# Patient Record
Sex: FEMALE | NOT HISPANIC OR LATINO | URBAN - METROPOLITAN AREA
[De-identification: names, ages, dates, MRNs, and addresses within clinical notes are randomized per-mention and may not be internally consistent; named-entity substitution may affect disease eponyms.]

---

## 2020-04-01 ENCOUNTER — HOSPITAL ENCOUNTER (OUTPATIENT)
Dept: GASTROENTEROLOGY | Facility: HOSPITAL | Age: 65
Setting detail: HOSPITAL OUTPATIENT SURGERY
Discharge: HOME | End: 2020-04-01
Attending: INTERNAL MEDICINE

## 2020-04-01 ENCOUNTER — OFFICE VISIT CONVERTED (OUTPATIENT)
Dept: PULMONOLOGY | Facility: CLINIC | Age: 65
End: 2020-04-01
Attending: INTERNAL MEDICINE

## 2020-04-01 LAB
EPI CELLS NFR FLD: 4 %
GLUCOSE BLD-MCNC: 227 MG/DL (ref 65–99)
GLUCOSE BLD-MCNC: 302 MG/DL (ref 65–99)
LYMPHOCYTES NFR FLD MANUAL: 4 %
MACROPHAGE FLUID: 15 /100{WBCS}
NEUTROPHILS NFR FLD MANUAL: 77 %
VISUAL PRESENCE OF BLOOD: NEGATIVE

## 2020-04-03 LAB
CEFEPIME SUSC ISLT: 2
CEFTAZIDIME SUSC ISLT: 4
CIPROFLOXACIN SUSC ISLT: <=0.25
CONV BRONCHIAL WASH CULTURE: ABNORMAL
CONV NOCARDIA STAIN (PARTIAL,MODIFIED ACID FAST): NORMAL
GENTAMICIN SUSC ISLT: <=1
LEVOFLOXACIN SUSC ISLT: 0.5
TOBRAMYCIN SUSC ISLT: <=1

## 2020-04-04 LAB
BACTERIA SPEC AEROBE CULT: ABNORMAL
CEFEPIME SUSC ISLT: 2
CEFTAZIDIME SUSC ISLT: 4
CIPROFLOXACIN SUSC ISLT: <=0.25
CONV HERPES SIMPLEX VIRUS QUAL. PCR: NOT DETECTED
GENTAMICIN SUSC ISLT: <=1
HERPES SIMPLEX VIRUS SOURCE: NORMAL
LEVOFLOXACIN SUSC ISLT: 0.25
TOBRAMYCIN SUSC ISLT: <=1

## 2020-04-05 LAB
CMV DNA SPEC QL NAA+PROBE: DETECTED
CONV CYTOMEGALOVIRUS VIRUS SOURCE: ABNORMAL

## 2020-04-08 LAB
CONV ADENOVIRUS  (BAL OR WASH): NEGATIVE
FLUAV RNA SPEC QL NAA+PROBE: NEGATIVE
FLUBV RNA ISLT QL NAA+PROBE: NEGATIVE
HMPV RNA SPEC QL NAA+PROBE: NEGATIVE
HPIV1 RNA ISLT QL NAA+PROBE: NEGATIVE
HPIV2 SPEC QL CULT: NEGATIVE
HPIV3 SPEC QL CULT: NEGATIVE
RHINOVIRUS RNA SPEC QL NAA+PROBE: POSITIVE
RSV A: NEGATIVE
RSV B RNA SPEC QL NAA+PROBE: NEGATIVE

## 2020-04-11 LAB — CONV LEGIONELLA CULTURE: NORMAL

## 2021-05-28 VITALS
HEIGHT: 59 IN | BODY MASS INDEX: 27.01 KG/M2 | TEMPERATURE: 98.1 F | DIASTOLIC BLOOD PRESSURE: 92 MMHG | HEART RATE: 73 BPM | OXYGEN SATURATION: 97 % | RESPIRATION RATE: 18 BRPM | SYSTOLIC BLOOD PRESSURE: 174 MMHG | WEIGHT: 134 LBS

## 2021-05-28 NOTE — PROGRESS NOTES
Patient: SUZIE SON     Acct: [id number]     Report: #[id number]  UNIT #: [id number]     : 1955    Encounter Date:2020  PRIMARY CARE: CHRISTINA CRABTREE  ***Signed***  --------------------------------------------------------------------------------------------------------------------  Chief Complaint      Encounter Date      2020            Primary Care Provider      A            christina johnson            Referring Provider      A            christina johnson            Patient Complaint      Patient is complaining of      soa/new patient            VITALS      Height 4 ft 11 in / 149.86 cm      Weight 134 lbs 0 oz / 60.006287 kg      BSA 1.56 m2      BMI 27.1 kg/m2      Temperature 98.1 F / 36.72 C - Oral      Pulse 73      Respirations 18      Blood Pressure 174/92 Sitting, Right Arm      Pulse Oximetry 97%, room air            HPI      The patient is a 64 year old female who sees Dr. Christina Crabtree for her primary     care. She has been having cough dyspnea, runny nose, stuffy nose, chest pain and    chest tightness for several weeks. She was having hacking cough and was     producing clear to yellow phlegm. She recently had CT scan of the chest done in     2020 that showed possible endobronchial lesion versus mucous plug     obstructing right middle lobe with atelectasis of right middle lobe and because     of that the patient is referred urgently to us. The patient has cough and is     currently on DuoNeb and brovana nebulizer but is not able to produce much     phlegm. She is a chronic smoker smoking 1-2 pack per day for many years. She has    chest discomfort and chest tightness and is not able to cough a lot. She has no     fever, no nausea or vomiting. I reviewed the CT scan of the chest results with     her today.            ROS      Constitutional:  Complains of: Fatigue; Denies: Fever, Weight gain, Weight loss,    Chills, Insomnia, Other      Respiratory/Breathing:   Complains of: Shortness of air, Wheezing, Cough; Denies:    Hemoptysis, Pleuritic pain, Other      Endocrine:  Denies: Polydipsia, Polyuria, Heat/cold intolerance, Abnorml     menstrual pattern, Diabetes, Other            FAMILY/SOCIAL/MEDICAL HX      Surgical History:  Yes: Appendectomy; No: AAA Repair, Abdominal Surgery,     Adenoids, Angioplasty, Back Surgery, Bladder Surgery, Bowel Surgery, Breast     Surgery, CABG, Carotid Stenosis, Cholecystectomy, Ear Surgery, Eye Surgery, Head    Surgery, Hernia Surgery, Kidney Surgery, Nose Surgery, Oral Surgery, Orthopedic     Surgery, Prostatectomy, Rectal Surgery, Spinal Surgery, Testicular Surgery,     Throat Surgery, Tonsils, Valve Replacement, Vascular Surgery, Other Surgeries      Diabetes - Family Hx:  Mother      Cancer/Type - Family Hx:  Mother      Is Father Still Living?:  No      Is Mother Still Living?:  No       Family History:  Yes      Social History:  Tobacco Use; No Alcohol Use, No Recreational Drug use      Smoking status:  Current every day smoker (started smoking at age 15 quit at age    64 03/30/20)      Anticoagulation Therapy:  No      Antibiotic Prophylaxis:  No      Medical History:  Yes: Chronic Bronchitis/COPD, Diabetes; No: Alcoholism, Al    lergies, Anemia, Arthritis, Asthma, Atrial Fibrillation, Blood Disease, Broken     Bones, Cataracts, Chemical Dependency, Chemotherapy/Cancer, Emphysema, Chronic     Liver Disease, Colon Trouble, Colitis, Diverticulitis, Congestive Heart Failu,     Deafness or Ringing Ears, Convulsions, Depression, Anxiety, Bipolar Disorder,     PTSD, Epilepsy, Seizures, Forgetfullness, Glaucoma, Gall Stones, Gout, Head     Injury, Heart Attack, Heart Murmur, GERD, Hemorrhoids/Rectal Prob, Hepatitis,     Hiatal Hernia, High Blood Pressure, High Cholesterol, HIV (Do not ask - volu,     Jaundice, Kidney or Bladder Disease, Kidney Stones, Migrane Headaches, Mitral     Valve Prolapse, Night sweats, Phlebitis, Psychiatric Care,  Reflux Disease,     Rheumatic Fever, Sexually Transmitted Dis, Shortness Of Breath, Sinus Trouble,     Skin Disease/Psoriais/Ecz, Stroke, Thyroid Problem, Tuberculosis or Pos TB Te,     Miscellaneous Medical/oth      Psychiatric History      none            PREVENTION      Hx Influenza Vaccination:  Yes      Date Influenza Vaccine Given:  Nov 1, 2019      Influenza Vaccine Declined:  No      2 or More Falls Past Year?:  No      Fall Past Year with Injury?:  No      Hx Pneumococcal Vaccination:  Yes      Encouraged to follow-up with:  PCP regarding preventative exams.      Chart initiated by      elinor orellana ma            ALLERGIES/MEDICATIONS      Allergies:        Coded Allergies:             NO KNOWN ALLERGIES (Unverified , 4/1/20)      Medications      Levocetirizine Dihydrochloride (Xyzal) 5 Mg Tablet      5 MG PO HS, TAB         Reported         4/1/20       Atorvastatin (Atorvastatin) 20 Mg Tablet      PO HS, #30 TAB 0 Refills         Reported         4/1/20       Montelukast Sodium (Montelukast*) 10 Mg Tablet      10 MG PO QDAY, TAB         Reported         4/1/20       Spironolactone (Spironolactone*) 50 Mg Tablet      50 MG PO QDAY, TAB         Reported         4/1/20       Metoprolol Succ/HCTZ (Metoprolol Hctz 100/50 Mg) 1 Each Tablet      1 TAB PO QDAY, TAB         Reported         4/1/20       metFORMIN HCl (metFORMIN HCl) 500 Mg Tablet      500 MG PO QDAY, #30 TAB 0 Refills         Reported         4/1/20       Albuterol Sulfate (Albuterol Sulfate) 1.25 Mg/3 Ml Vial.neb      1.25 MG INH RTTID for 30 Days, #90 NEB         Reported         4/1/20       Escitalopram Oxalate (Escitalopram Oxalate*) 10 Mg Tablet      20 MG PO QDAY, TAB         Reported         4/1/20       Roflumilast (Daliresp) 500 Mcg Tab      500 MCG PO QDAY for 30 Days, #30 TAB         Reported         4/1/20       Insulin Glargine,Hum.rec.anlog (Toujeo Solostar) 300 Unit/1 Ml Insuln.pen      10 UNITS SUBQ QDAY, #1 INSULN.PEN          Reported         4/1/20       Dapagliflozin Propanediol (Farxiga) 10 Mg Tablet      10 MG PO QDAY, #30 TAB 0 Refills         Reported         4/1/20       Arformoterol Tartrate (Brovana) 15 Mcg/2 Ml Vial.neb      15 MCG INH RTBID, #60 NEB         Reported         4/1/20       Ipratropium Bromide Nasal (Atrovent 0.03% Nasal) 30 Ml [place]      2 PUFFS NARE EACH TID, #1 BOTTLE         Reported         4/1/20       predniSONE (predniSONE) 5 Mg Tablet      5 MG PO QDAY, TAB         Reported         4/1/20      Current Medications      Current Medications Reviewed 4/1/20            EXAM      CONSTITUTIONAL: Pleasant female in no acute distress,  normal conversant.       EYES : Pink conjunctive, no ptosis, PERRL.       ENMT : Nose and ears appear normal, normal dentition, mild posterior pharyngeal     wall erythema, no sinus tenderness. Mallampati classification II      Neck: Nontender, no masses, no thyromegaly, no nodules.      Resp : Bilateral diminished breath sounds, no wheezing or crackles, scattered     rhonchi on the right side. Resonant to percussion bilaterally.      CVS  : No carotid bruits, s1s2 nl, RRR, no murmur, rubs or gallop, no peripheral    edema       Chest wall: Normal rise with inspiration, nontender on palpation.      GI   : Abdomen soft, with no masses, no hepatosplenomegaly, no hernias, BS+      MSK  : Normal gait and station, no digital cyanosis or clubbing       Skin : No rashes, ulcerations or lesions, normal turgor and temperature      Neuro: CN II - XII intact, no sensory deficits, DTRs intact and symmetrical, no     motor weakness      Psych: Appropriate affect, A   Vtials      Vitals:             Height 4 ft 11 in / 149.86 cm           Weight 134 lbs 0 oz / 60.205757 kg           BSA 1.56 m2           BMI 27.1 kg/m2           Temperature 98.1 F / 36.72 C - Oral           Pulse 73           Respirations 18           Blood Pressure 174/92 Sitting, Right Arm           Pulse Oximetry 97%, room  air            REVIEW      Results Reviewed      P[facility] Results Reviewed?:  Yes Prev Lab Results, Yes Prev Radiology Results      Lab Results      The patient's office visit note from Dr. Molina's office was reviewed.      Radiographic Results      The patient's CT scan of the chest from [facility] was reviewed done on    03/30/2020 showed occluded right middle lobe bronchus with right middle lobe     collapse. This could be secondary to mucous plugging, aspiration or     endobronchial lesion. No mediastinal lymphadenopathy was seen. There was     interval wedge compression of T5 and T6 without retropulsion was seen.            Assessment      Notes      New Medications      * predniSONE 5 MG TABLET: 5 MG PO QDAY      * Ipratropium Bromide Nasal (Atrovent 0.03% Nasal) 30 ML SPRAY: 2 PUFFS NARE       EACH TID #1      * ARFORMOTEROL TARTRATE (Brovana) 15 MCG/2 ML VIAL.NEB: 15 MCG INH RTBID #60         Instructions: DIAGNOSIS CODE REQUIRED PRIOR TO PRESCRIBING.      * DAPAGLIFLOZIN PROPANEDIOL (Farxiga) 10 MG TABLET: 10 MG PO QDAY #30      * INSULIN GLARGINE,HUM.REC.ANLOG (Toujeo Solostar) 300 UNIT/1 ML INSULN.PEN: 10       UNITS SUBQ QDAY #1         Instructions: Dispense 1 box of 100 pen needles.      * ROFLUMILAST (Daliresp) 500 MCG TAB: 500 MCG PO QDAY 30 Days #30      * ESCITALOPRAM OXALATE (Escitalopram Oxalate*) 10 MG TABLET: 20 MG PO QDAY      * Albuterol Sulfate 1.25 MG/3 ML VIAL.NEB: 1.25 MG INH RTTID 30 Days #90         Instructions: DIAGNOSIS CODE REQUIRED PRIOR TO PRESCRIBING.      * metFORMIN HCl 500 MG TABLET: 500 MG PO QDAY #30      * Metoprolol Succ/HCTZ (Metoprolol Hctz 100/50 Mg) 1 EACH TABLET: 1 TAB PO QDAY      * Spironolactone (Spironolactone*) 50 MG TABLET: 50 MG PO QDAY      * MONTELUKAST SODIUM (Montelukast*) 10 MG TABLET: 10 MG PO QDAY      * Atorvastatin 20 MG TABLET: PO HS #30      * Levocetirizine Dihydrochloride (Xyzal) 5 MG TABLET: 5 MG PO HS      PLAN:      The patient is a 64 year  old female with chronic heavy smoking, likely has     chronic obstructive pulmonary disease and possible endobronchial lesion versus     mucous plug in the right middle lobe.             1. Right middle lobe endobronchial lesion versus mucous plug. Bronchoscopy is     indicated. I discussed bronchoscopy with bronchalveolar lavage and airway     inspection. Risks and benefits were discussed in detail and alternatives and     options were discussed. Risks including pneumonia, pneumothorax, and respiratory    depression were all reviewed. She understands and is agreeable for the     procedure. She has eaten something around 6 AM and is willing to have the     procedure today. We will schedule it later today if possible.       2. Chronic obstructive pulmonary disease with acute exacerbation. Continue with     DuoNeb every 4 hours while awake, continue brovana for now. She needs to stop     smoking.       3. Smoking cessation counseling was done for more than 5 minutes. She has not     smoked for 2 days and says she is able to quit without any smoking cessation     methods.       4. I will follow up with her in 2-3 weeks on Telehealth consult or in person     visit if needed.            Procedure Orders      Get Consent signed for:        Bronchoscopy with airway inspection, bronchalveolar lavage and possible      mucous plugging removal.      Risks and Benefits:        Risks and benefits were discussed in detail. The patient understands the      risks including pneumothorax, pneumonia, respiratory depression, bleeding      and that it could be fatal as well. The patient is agreeable for the      procedure. Alternatives and options were also discussed. The patient is      needs to be NPO for the procedure.            Electronically signed by Shaquille Brandon  04/01/2020 13:06       Disclaimer: Converted document may not contain table formatting or lab diagrams. Please see [employer] for the  authenticated document.